# Patient Record
Sex: FEMALE | URBAN - METROPOLITAN AREA
[De-identification: names, ages, dates, MRNs, and addresses within clinical notes are randomized per-mention and may not be internally consistent; named-entity substitution may affect disease eponyms.]

---

## 2023-04-14 ENCOUNTER — OFFICE VISIT (OUTPATIENT)
Dept: URBAN - METROPOLITAN AREA CLINIC 17 | Facility: CLINIC | Age: 28
End: 2023-04-14
Payer: COMMERCIAL

## 2023-04-14 DIAGNOSIS — S05.01XA CORNEAL ABRASION W/O FOREIGN BODY OF RIGHT EYE, INITIAL ENCOUNTER: Primary | ICD-10-CM

## 2023-04-14 PROCEDURE — 99204 OFFICE O/P NEW MOD 45 MIN: CPT | Performed by: OPTOMETRIST

## 2023-04-14 NOTE — IMPRESSION/PLAN
Impression: Corneal abrasion w/o foreign body of right eye, initial encounter: S05. 01XA. Plan: Discussed diagnosis in detail with patient. Advised patient of condition. Instilled Proparacaine. Debrided cornea using triangular cotton swabs to remove extra tissue to ensure proper healing. Recommend patient avoid wearing contacts x1 week. Continue using Ofloxacin QID OD prescribed by ER. Informed patient to call back on Monday if symptoms do not improve.